# Patient Record
Sex: FEMALE | Race: WHITE | NOT HISPANIC OR LATINO | Employment: STUDENT | URBAN - METROPOLITAN AREA
[De-identification: names, ages, dates, MRNs, and addresses within clinical notes are randomized per-mention and may not be internally consistent; named-entity substitution may affect disease eponyms.]

---

## 2022-03-04 ENCOUNTER — APPOINTMENT (OUTPATIENT)
Dept: RADIOLOGY | Facility: CLINIC | Age: 32
End: 2022-03-04
Payer: COMMERCIAL

## 2022-03-04 ENCOUNTER — OFFICE VISIT (OUTPATIENT)
Dept: OBGYN CLINIC | Facility: CLINIC | Age: 32
End: 2022-03-04
Payer: COMMERCIAL

## 2022-03-04 VITALS — HEART RATE: 83 BPM | TEMPERATURE: 99 F | DIASTOLIC BLOOD PRESSURE: 86 MMHG | SYSTOLIC BLOOD PRESSURE: 120 MMHG

## 2022-03-04 DIAGNOSIS — S62.619A CLOSED AVULSION FRACTURE OF PROXIMAL PHALANX OF FINGER, INITIAL ENCOUNTER: Primary | ICD-10-CM

## 2022-03-04 DIAGNOSIS — M65.322 TRIGGER FINGER, LEFT INDEX FINGER: ICD-10-CM

## 2022-03-04 PROCEDURE — 73130 X-RAY EXAM OF HAND: CPT

## 2022-03-04 PROCEDURE — 99213 OFFICE O/P EST LOW 20 MIN: CPT | Performed by: ORTHOPAEDIC SURGERY

## 2022-03-04 NOTE — PROGRESS NOTES
Assessment/Plan:  1  Closed avulsion fracture of proximal phalanx of finger, initial encounter  XR hand 3+ vw left    CANCELED: XR finger left second digit-index       Leonor has pain to the base of the second proximal phalanx at the MCP joint and an x-ray demonstrates a slight avulsion injury at this level  I do think immobilization with a TKO brace at this time is appropriate and follow up in 3 weeks for repeat x-ray and evaluation  Subjective:   Jett Milian is a 29 y o  female who presents to the office for evaluation for left index finger injury  She jammed her finger on 2/28/2022 and has had pain to the base of the second digit  She has tried splinting finger at home as well as taping her fingers together  Does help slightly with the pain  She describes an aching throbbing pain to the base of the second digit that comes sharp with any motion  Does feel better if she is immobilized  She has noticed some slight bruising at the base of her second digit  Review of Systems   Constitutional: Negative for chills, fever and unexpected weight change  HENT: Negative for hearing loss, nosebleeds and sore throat  Eyes: Negative for pain, redness and visual disturbance  Respiratory: Negative for cough, shortness of breath and wheezing  Cardiovascular: Negative for chest pain, palpitations and leg swelling  Gastrointestinal: Negative for abdominal pain, nausea and vomiting  Endocrine: Negative for polydipsia and polyuria  Genitourinary: Negative for dysuria and hematuria  Musculoskeletal:        See HPI   Skin: Negative for rash and wound  Neurological: Negative for dizziness, numbness and headaches  Psychiatric/Behavioral: Negative for decreased concentration and suicidal ideas  The patient is not nervous/anxious  No past medical history on file  No past surgical history on file  No family history on file      Social History     Occupational History    Not on file Tobacco Use    Smoking status: Never Smoker    Smokeless tobacco: Never Used   Substance and Sexual Activity    Alcohol use: Yes     Alcohol/week: 2 0 standard drinks     Types: 2 Glasses of wine per week    Drug use: Never    Sexual activity: Not on file       No current outpatient medications on file  No Known Allergies    Objective:  Vitals:    03/04/22 1437   BP: 120/86   Pulse: 83   Temp: 99 °F (37 2 °C)       Left Hand Exam     Tenderness   Left hand tenderness location: Tenderness to palpation over base of proximal phalanx and second digit  Range of Motion   Hand   MP Thumb: normal   MP Index: 10   MP Middle: normal   PIP Index: 20   PIP Middle: normal   DIP Thumb: normal   DIP Index: 20   DIP Middle: normal     Other   Erythema: absent  Sensation: normal  Pulse: present            Physical Exam  Vitals and nursing note reviewed  Constitutional:       Appearance: She is well-developed  HENT:      Head: Normocephalic and atraumatic  Eyes:      General: No scleral icterus  Conjunctiva/sclera: Conjunctivae normal    Cardiovascular:      Rate and Rhythm: Normal rate  Pulmonary:      Effort: Pulmonary effort is normal  No respiratory distress  Musculoskeletal:      Cervical back: Normal range of motion and neck supple  Comments: As noted in HPI   Skin:     General: Skin is warm and dry  Neurological:      Mental Status: She is alert and oriented to person, place, and time  Psychiatric:         Behavior: Behavior normal          I have personally reviewed pertinent films in PACS and my interpretation is as follows: Three-view x-rays of the left hand demonstrates a small avulsion to the base of the proximal phalanx in the second digit consistent with avulsion injury    This correlates with patient's pain

## 2022-03-25 ENCOUNTER — APPOINTMENT (OUTPATIENT)
Dept: RADIOLOGY | Facility: CLINIC | Age: 32
End: 2022-03-25
Payer: COMMERCIAL

## 2022-03-25 ENCOUNTER — OFFICE VISIT (OUTPATIENT)
Dept: OBGYN CLINIC | Facility: CLINIC | Age: 32
End: 2022-03-25
Payer: COMMERCIAL

## 2022-03-25 VITALS
BODY MASS INDEX: 23.91 KG/M2 | WEIGHT: 121.8 LBS | DIASTOLIC BLOOD PRESSURE: 87 MMHG | HEIGHT: 60 IN | TEMPERATURE: 98.7 F | SYSTOLIC BLOOD PRESSURE: 116 MMHG | HEART RATE: 112 BPM

## 2022-03-25 DIAGNOSIS — S62.619D CLOSED AVULSION FRACTURE OF PROXIMAL PHALANX OF FINGER WITH ROUTINE HEALING, SUBSEQUENT ENCOUNTER: ICD-10-CM

## 2022-03-25 DIAGNOSIS — M79.645 PAIN IN FINGER OF LEFT HAND: ICD-10-CM

## 2022-03-25 DIAGNOSIS — M79.645 PAIN IN FINGER OF LEFT HAND: Primary | ICD-10-CM

## 2022-03-25 PROCEDURE — 73130 X-RAY EXAM OF HAND: CPT

## 2022-03-25 PROCEDURE — 99213 OFFICE O/P EST LOW 20 MIN: CPT | Performed by: ORTHOPAEDIC SURGERY

## 2022-03-25 RX ORDER — METHYLPHENIDATE HYDROCHLORIDE 10 MG/1
10 TABLET ORAL
COMMUNITY

## 2022-03-25 RX ORDER — ALPRAZOLAM 0.5 MG/1
TABLET ORAL
COMMUNITY

## 2022-03-25 RX ORDER — FLUOXETINE HYDROCHLORIDE 20 MG/1
20 CAPSULE ORAL DAILY
COMMUNITY

## 2022-03-25 RX ORDER — METHYLPHENIDATE HYDROCHLORIDE 36 MG/1
36 TABLET ORAL DAILY
COMMUNITY

## 2022-03-25 NOTE — PROGRESS NOTES
Assessment/Plan:  1  Pain in finger of left hand  XR hand 3+ vw left       Nila Boogie is doing very well and has a stable healing avulsion fracture of the base of the second finger  She can come out of the TKO brace and only needs to julissa tape with activity over the next week  She should work on range of motion of the finger as it is somewhat stiff  She has full strength and range of motion today  She will follow up only if needed  Subjective:   Cathy Melendez is a 29 y o  female who presents to the office for follow-up for a left index finger injury  She was seen in the office 3 weeks ago with discomfort to the base of her second digit after jamming her finger  She had a slight avulsion injury off of the base of the proximal phalanx in the second finger  She was placed in a TKO brace and states that her finger feels much better after immobilization  She has some slight stiffness but denies any pain to the base of her second finger  Review of Systems   Constitutional: Negative for chills, fever and unexpected weight change  HENT: Negative for hearing loss, nosebleeds and sore throat  Eyes: Negative for pain, redness and visual disturbance  Respiratory: Negative for cough, shortness of breath and wheezing  Cardiovascular: Negative for chest pain, palpitations and leg swelling  Gastrointestinal: Negative for abdominal pain, nausea and vomiting  Endocrine: Negative for polydipsia and polyuria  Genitourinary: Negative for dysuria and hematuria  Musculoskeletal:        See HPI   Skin: Negative for rash and wound  Neurological: Negative for dizziness, numbness and headaches  Psychiatric/Behavioral: Negative for decreased concentration and suicidal ideas  The patient is not nervous/anxious  History reviewed  No pertinent past medical history  History reviewed  No pertinent surgical history  History reviewed  No pertinent family history      Social History     Occupational History    Not on file   Tobacco Use    Smoking status: Never Smoker    Smokeless tobacco: Never Used   Substance and Sexual Activity    Alcohol use: Yes     Alcohol/week: 2 0 standard drinks     Types: 2 Glasses of wine per week    Drug use: Never    Sexual activity: Not on file         Current Outpatient Medications:     ALPRAZolam (XANAX) 0 5 mg tablet, Take by mouth daily at bedtime as needed for anxiety, Disp: , Rfl:     FLUoxetine (PROzac) 20 mg capsule, Take 20 mg by mouth daily, Disp: , Rfl:     methylphenidate (CONCERTA) 36 MG ER tablet, Take 36 mg by mouth daily, Disp: , Rfl:     methylphenidate (RITALIN) 10 mg tablet, Take 10 mg by mouth 2 (two) times a day before breakfast and lunch, Disp: , Rfl:     No Known Allergies    Objective:  Vitals:    03/25/22 1417   BP: 116/87   Pulse: (!) 112   Temp: 98 7 °F (37 1 °C)       Left Hand Exam     Tenderness   Left hand tenderness location: No tenderness to palpation over base of second finger  Range of Motion   Hand   MP Index: normal   PIP Index: normal   DIP Index: normal     Other   Erythema: absent  Sensation: normal  Pulse: present            Physical Exam  Vitals and nursing note reviewed  Constitutional:       Appearance: She is well-developed  HENT:      Head: Normocephalic and atraumatic  Eyes:      General: No scleral icterus  Conjunctiva/sclera: Conjunctivae normal    Cardiovascular:      Rate and Rhythm: Normal rate  Pulmonary:      Effort: Pulmonary effort is normal  No respiratory distress  Musculoskeletal:      Cervical back: Normal range of motion and neck supple  Comments: As noted in HPI   Skin:     General: Skin is warm and dry  Neurological:      Mental Status: She is alert and oriented to person, place, and time  Psychiatric:         Behavior: Behavior normal          I have personally reviewed pertinent films in PACS and my interpretation is as follows:   Three-view x-rays of the left hand in the office today demonstrate a stable, healing small avulsion fracture of the base of the proximal phalanx in the second digit

## 2023-12-17 ENCOUNTER — NURSE TRIAGE (OUTPATIENT)
Dept: OTHER | Facility: OTHER | Age: 33
End: 2023-12-17

## 2023-12-17 NOTE — TELEPHONE ENCOUNTER
"Reason for Disposition  • Harmless swallowed FB and no symptoms    Answer Assessment - Initial Assessment Questions  1. OBJECT: \"What is it?\"       Brazil Nut     2. SIZE: \"How large is it?\" (inches or cm, or compare it to standard coins)       Larger than a quarter, about an inch long and a 1/2 wide           3. ONSET: \"How long ago did you swallow it?\" (e.g., minutes, hours)       About an hour ago     4. MECHANISM: \"Tell me how it happened.\"       Accidentally took one with a handful of pills     5. OTHER SYMPTOMS: \"Are there any other symptoms?\" (e.g., pain in neck or chest, difficulty breathing, difficulty swallowing)      Denies     6. PREGNANCY: \"Is there any chance you are pregnant?\" \"When was your last menstrual period?\"      Denies- currently on her period,having period cramps    Protocols used: Swallowed Foreign Body-ADULT-AH    "
"Regarding: Swallowed foreign object  ----- Message from Ricardo Gaming sent at 12/17/2023 12:29 PM EST -----  \"I swallowed an entire brazille nut along with a handful of medication. I want to know if I should throw up.\"    "
Patient calling in stating about an hour ago she realized she swallowed a whole Brazil Nut with a handful of her daily pills. Patient stated she typically eats one Brazil nut every 3 weeks for the Selenium. Patient stated she didn't realize she swallowed the whole nut initially until shortly after. Stated she had no trouble swallowing it and has no chest pain, pain in her neck or trouble breathing. Patient unsure if the size of the nut is an issue- stated it is about an inch long and 1/2 wide. Informed patient that the Brazil nut will likely pass naturally without issues especially due to her having no issues with swallowing it. Recommended patient try drinking water and eating some bread to make sure she is swallowing fine and has no new pains. Informed patient that she could go to her local urgent care if she is very anxious or concerned. Patient verbalized understanding.   
negative...